# Patient Record
Sex: FEMALE | Race: WHITE | NOT HISPANIC OR LATINO | URBAN - METROPOLITAN AREA
[De-identification: names, ages, dates, MRNs, and addresses within clinical notes are randomized per-mention and may not be internally consistent; named-entity substitution may affect disease eponyms.]

---

## 2020-02-25 ENCOUNTER — APPOINTMENT (RX ONLY)
Dept: URBAN - METROPOLITAN AREA CLINIC 374 | Facility: CLINIC | Age: 20
Setting detail: DERMATOLOGY
End: 2020-02-25

## 2020-02-25 DIAGNOSIS — L70.0 ACNE VULGARIS: ICD-10-CM

## 2020-02-25 PROCEDURE — 99202 OFFICE O/P NEW SF 15 MIN: CPT

## 2020-02-25 PROCEDURE — ? PRESCRIPTION MEDICATION MANAGEMENT

## 2020-02-25 PROCEDURE — ? MEDICATION COUNSELING

## 2020-02-25 PROCEDURE — ? PRESCRIPTION

## 2020-02-25 PROCEDURE — ? COUNSELING

## 2020-02-25 RX ORDER — ADAPALENE AND BENZOYL PEROXIDE 3; 25 MG/G; MG/G
GEL TOPICAL QHS
Qty: 1 | Refills: 3 | Status: ERX | COMMUNITY
Start: 2020-02-25

## 2020-02-25 RX ORDER — DAPSONE 75 MG/G
GEL TOPICAL
Qty: 1 | Refills: 3 | Status: ERX | COMMUNITY
Start: 2020-02-25

## 2020-02-25 RX ADMIN — ADAPALENE AND BENZOYL PEROXIDE: 3; 25 GEL TOPICAL at 00:00

## 2020-02-25 RX ADMIN — DAPSONE: 75 GEL TOPICAL at 00:00

## 2020-02-25 ASSESSMENT — LOCATION SIMPLE DESCRIPTION DERM
LOCATION SIMPLE: LEFT CHEEK
LOCATION SIMPLE: RIGHT CHEEK
LOCATION SIMPLE: INFERIOR FOREHEAD

## 2020-02-25 ASSESSMENT — LOCATION DETAILED DESCRIPTION DERM
LOCATION DETAILED: RIGHT CENTRAL MALAR CHEEK
LOCATION DETAILED: LEFT CENTRAL MALAR CHEEK
LOCATION DETAILED: INFERIOR MID FOREHEAD

## 2020-02-25 ASSESSMENT — LOCATION ZONE DERM: LOCATION ZONE: FACE

## 2020-02-25 NOTE — PROCEDURE: MEDICATION COUNSELING
Xelvladimirz Pregnancy And Lactation Text: This medication is Pregnancy Category D and is not considered safe during pregnancy.  The risk during breast feeding is also uncertain.

## 2020-02-25 NOTE — PROCEDURE: COUNSELING
Detail Level: Detailed
Azithromycin Pregnancy And Lactation Text: This medication is considered safe during pregnancy and is also secreted in breast milk.
Topical Retinoid counseling:  Patient advised to apply a pea-sized amount only at bedtime and wait 30 minutes after washing their face before applying.  If too drying, patient may add a non-comedogenic moisturizer. The patient verbalized understanding of the proper use and possible adverse effects of retinoids.  All of the patient's questions and concerns were addressed.
Use Enhanced Medication Counseling?: No
High Dose Vitamin A Pregnancy And Lactation Text: High dose vitamin A therapy is contraindicated during pregnancy and breast feeding.
Topical Clindamycin Pregnancy And Lactation Text: This medication is Pregnancy Category B and is considered safe during pregnancy. It is unknown if it is excreted in breast milk.
Tetracycline Counseling: Patient counseled regarding possible photosensitivity and increased risk for sunburn.  Patient instructed to avoid sunlight, if possible.  When exposed to sunlight, patients should wear protective clothing, sunglasses, and sunscreen.  The patient was instructed to call the office immediately if the following severe adverse effects occur:  hearing changes, easy bruising/bleeding, severe headache, or vision changes.  The patient verbalized understanding of the proper use and possible adverse effects of tetracycline.  All of the patient's questions and concerns were addressed. Patient understands to avoid pregnancy while on therapy due to potential birth defects.
Dapsone Counseling: I discussed with the patient the risks of dapsone including but not limited to hemolytic anemia, agranulocytosis, rashes, methemoglobinemia, kidney failure, peripheral neuropathy, headaches, GI upset, and liver toxicity.  Patients who start dapsone require monitoring including baseline LFTs and weekly CBCs for the first month, then every month thereafter.  The patient verbalized understanding of the proper use and possible adverse effects of dapsone.  All of the patient's questions and concerns were addressed.
Erythromycin Pregnancy And Lactation Text: This medication is Pregnancy Category B and is considered safe during pregnancy. It is also excreted in breast milk.
Bactrim Counseling:  I discussed with the patient the risks of sulfa antibiotics including but not limited to GI upset, allergic reaction, drug rash, diarrhea, dizziness, photosensitivity, and yeast infections.  Rarely, more serious reactions can occur including but not limited to aplastic anemia, agranulocytosis, methemoglobinemia, blood dyscrasias, liver or kidney failure, lung infiltrates or desquamative/blistering drug rashes.
Topical Retinoid Pregnancy And Lactation Text: This medication is Pregnancy Category C. It is unknown if this medication is excreted in breast milk.
Topical Sulfur Applications Counseling: Topical Sulfur Counseling: Patient counseled that this medication may cause skin irritation or allergic reactions.  In the event of skin irritation, the patient was advised to reduce the amount of the drug applied or use it less frequently.   The patient verbalized understanding of the proper use and possible adverse effects of topical sulfur application.  All of the patient's questions and concerns were addressed.
Minocycline Counseling: Patient advised regarding possible photosensitivity and discoloration of the teeth, skin, lips, tongue and gums.  Patient instructed to avoid sunlight, if possible.  When exposed to sunlight, patients should wear protective clothing, sunglasses, and sunscreen.  The patient was instructed to call the office immediately if the following severe adverse effects occur:  hearing changes, easy bruising/bleeding, severe headache, or vision changes.  The patient verbalized understanding of the proper use and possible adverse effects of minocycline.  All of the patient's questions and concerns were addressed.
Dapsone Pregnancy And Lactation Text: This medication is Pregnancy Category C and is not considered safe during pregnancy or breast feeding.
Tetracycline Pregnancy And Lactation Text: This medication is Pregnancy Category D and not consider safe during pregnancy. It is also excreted in breast milk.
Isotretinoin Counseling: Patient should get monthly blood tests, not donate blood, not drive at night if vision affected, not share medication, and not undergo elective surgery for 6 months after tx completed. Side effects reviewed, pt to contact office should one occur.
Bactrim Pregnancy And Lactation Text: This medication is Pregnancy Category D and is known to cause fetal risk.  It is also excreted in breast milk.
Tazorac Counseling:  Patient advised that medication is irritating and drying.  Patient may need to apply sparingly and wash off after an hour before eventually leaving it on overnight.  The patient verbalized understanding of the proper use and possible adverse effects of tazorac.  All of the patient's questions and concerns were addressed.
Topical Sulfur Applications Pregnancy And Lactation Text: This medication is Pregnancy Category C and has an unknown safety profile during pregnancy. It is unknown if this topical medication is excreted in breast milk.
Benzoyl Peroxide Counseling: Patient counseled that medicine may cause skin irritation and bleach clothing.  In the event of skin irritation, the patient was advised to reduce the amount of the drug applied or use it less frequently.   The patient verbalized understanding of the proper use and possible adverse effects of benzoyl peroxide.  All of the patient's questions and concerns were addressed.
Doxycycline Counseling:  Patient counseled regarding possible photosensitivity and increased risk for sunburn.  Patient instructed to avoid sunlight, if possible.  When exposed to sunlight, patients should wear protective clothing, sunglasses, and sunscreen.  The patient was instructed to call the office immediately if the following severe adverse effects occur:  hearing changes, easy bruising/bleeding, severe headache, or vision changes.  The patient verbalized understanding of the proper use and possible adverse effects of doxycycline.  All of the patient's questions and concerns were addressed.
Isotretinoin Pregnancy And Lactation Text: This medication is Pregnancy Category X and is considered extremely dangerous during pregnancy. It is unknown if it is excreted in breast milk.
Birth Control Pills Counseling: Birth Control Pill Counseling: I discussed with the patient the potential side effects of OCPs including but not limited to increased risk of stroke, heart attack, thrombophlebitis, deep venous thrombosis, hepatic adenomas, breast changes, GI upset, headaches, and depression.  The patient verbalized understanding of the proper use and possible adverse effects of OCPs. All of the patient's questions and concerns were addressed.
Tazorac Pregnancy And Lactation Text: This medication is not safe during pregnancy. It is unknown if this medication is excreted in breast milk.
Spironolactone Counseling: Patient advised regarding risks of diarrhea, abdominal pain, hyperkalemia, birth defects (for female patients), liver toxicity and renal toxicity. The patient may need blood work to monitor liver and kidney function and potassium levels while on therapy. The patient verbalized understanding of the proper use and possible adverse effects of spironolactone.  All of the patient's questions and concerns were addressed.
Doxycycline Pregnancy And Lactation Text: This medication is Pregnancy Category D and not consider safe during pregnancy. It is also excreted in breast milk but is considered safe for shorter treatment courses.
Azithromycin Counseling:  I discussed with the patient the risks of azithromycin including but not limited to GI upset, allergic reaction, drug rash, diarrhea, and yeast infections.
Benzoyl Peroxide Pregnancy And Lactation Text: This medication is Pregnancy Category C. It is unknown if benzoyl peroxide is excreted in breast milk.
Birth Control Pills Pregnancy And Lactation Text: This medication should be avoided if pregnant and for the first 30 days post-partum.
Topical Clindamycin Counseling: Patient counseled that this medication may cause skin irritation or allergic reactions.  In the event of skin irritation, the patient was advised to reduce the amount of the drug applied or use it less frequently.   The patient verbalized understanding of the proper use and possible adverse effects of clindamycin.  All of the patient's questions and concerns were addressed.
High Dose Vitamin A Counseling: Side effects reviewed, pt to contact office should one occur.
Spironolactone Pregnancy And Lactation Text: This medication can cause feminization of the male fetus and should be avoided during pregnancy. The active metabolite is also found in breast milk.
Erythromycin Counseling:  I discussed with the patient the risks of erythromycin including but not limited to GI upset, allergic reaction, drug rash, diarrhea, increase in liver enzymes, and yeast infections.

## 2020-09-08 ENCOUNTER — RX ONLY (OUTPATIENT)
Age: 20
Setting detail: RX ONLY
End: 2020-09-08

## 2020-09-08 RX ORDER — MINOCYCLINE HYDROCHLORIDE 90 MG/1
CAPSULE, EXTENDED RELEASE ORAL
Qty: 30 | Refills: 1 | Status: ERX

## 2021-01-12 ENCOUNTER — APPOINTMENT (RX ONLY)
Dept: URBAN - METROPOLITAN AREA CLINIC 374 | Facility: CLINIC | Age: 21
Setting detail: DERMATOLOGY
End: 2021-01-12

## 2021-01-12 DIAGNOSIS — L70.0 ACNE VULGARIS: ICD-10-CM | Status: INADEQUATELY CONTROLLED

## 2021-01-12 PROCEDURE — ? COUNSELING

## 2021-01-12 PROCEDURE — ? PRESCRIPTION

## 2021-01-12 PROCEDURE — ? PRESCRIPTION MEDICATION MANAGEMENT

## 2021-01-12 PROCEDURE — 99214 OFFICE O/P EST MOD 30 MIN: CPT

## 2021-01-12 RX ORDER — CLINDAMYCIN PHOSPHATE AND BENZOYL PEROXIDE 10; 37.5 MG/G; MG/G
1 GEL TOPICAL BID
Qty: 1 | Refills: 3 | Status: CANCELLED | COMMUNITY
Start: 2021-01-12

## 2021-01-12 RX ORDER — MINOCYCLINE HYDROCHLORIDE 90 MG/1
CAPSULE, EXTENDED RELEASE ORAL
Qty: 30 | Refills: 3 | Status: ERX

## 2021-01-12 RX ADMIN — CLINDAMYCIN PHOSPHATE AND BENZOYL PEROXIDE 1: 10; 37.5 GEL TOPICAL at 00:00

## 2021-01-12 ASSESSMENT — LOCATION SIMPLE DESCRIPTION DERM
LOCATION SIMPLE: INFERIOR FOREHEAD
LOCATION SIMPLE: RIGHT CHEEK
LOCATION SIMPLE: LEFT CHEEK

## 2021-01-12 ASSESSMENT — LOCATION DETAILED DESCRIPTION DERM
LOCATION DETAILED: INFERIOR MID FOREHEAD
LOCATION DETAILED: RIGHT CENTRAL MALAR CHEEK
LOCATION DETAILED: LEFT CENTRAL MALAR CHEEK

## 2021-01-12 ASSESSMENT — LOCATION ZONE DERM: LOCATION ZONE: FACE

## 2021-01-12 NOTE — PROCEDURE: PRESCRIPTION MEDICATION MANAGEMENT
Render In Strict Bullet Format?: No
Detail Level: Zone
Initiate Treatment: Ximino 90 mg capsule, extended release: Take 1 tab po QDAY with FOOD\\nOnexton 1.2 % (1 % base)-3.75 % topical gel Bid: Apply to face bid

## 2021-01-14 ENCOUNTER — RX ONLY (OUTPATIENT)
Age: 21
Setting detail: RX ONLY
End: 2021-01-14

## 2021-01-14 RX ORDER — CLINDAMYCIN PHOSPHATE AND BENZOYL PEROXIDE 10; 50 MG/G; MG/G
GEL TOPICAL
Qty: 1 | Refills: 3 | Status: ERX | COMMUNITY
Start: 2021-01-14

## 2021-03-02 ENCOUNTER — APPOINTMENT (RX ONLY)
Dept: URBAN - METROPOLITAN AREA CLINIC 374 | Facility: CLINIC | Age: 21
Setting detail: DERMATOLOGY
End: 2021-03-02

## 2021-03-02 DIAGNOSIS — L70.0 ACNE VULGARIS: ICD-10-CM | Status: WORSENING

## 2021-03-02 PROCEDURE — ? PRESCRIPTION

## 2021-03-02 PROCEDURE — 99214 OFFICE O/P EST MOD 30 MIN: CPT

## 2021-03-02 PROCEDURE — ? PRESCRIPTION MEDICATION MANAGEMENT

## 2021-03-02 RX ORDER — TAZAROTENE 0.45 MG/G
1 LOTION TOPICAL QHS
Qty: 1 | Refills: 3 | Status: ERX | COMMUNITY
Start: 2021-03-02

## 2021-03-02 RX ORDER — MINOCYCLINE 40 MG/G
1 AEROSOL, FOAM TOPICAL QAM
Qty: 1 | Refills: 2 | Status: ERX | COMMUNITY
Start: 2021-03-02

## 2021-03-02 RX ADMIN — MINOCYCLINE 1: 40 AEROSOL, FOAM TOPICAL at 00:00

## 2021-03-02 RX ADMIN — TAZAROTENE 1: 0.45 LOTION TOPICAL at 00:00

## 2021-03-02 ASSESSMENT — LOCATION SIMPLE DESCRIPTION DERM
LOCATION SIMPLE: LEFT CHEEK
LOCATION SIMPLE: INFERIOR FOREHEAD
LOCATION SIMPLE: RIGHT CHEEK

## 2021-03-02 ASSESSMENT — LOCATION ZONE DERM: LOCATION ZONE: FACE

## 2021-03-02 NOTE — PROCEDURE: PRESCRIPTION MEDICATION MANAGEMENT
Discontinue Regimen: Onexton 1.2 % (1 % base)-3.75 % topical gel Bid: Apply to face bid
Render In Strict Bullet Format?: No
Continue Regimen: Ximino 90 mg capsule, extended release: Take 1 tab po QDAY with FOOD
Detail Level: Zone
Initiate Treatment: Arazlo 0.045 % lotion Qhs: apply a pea size amount QHS to acne of face\\nAmzeeq 4 % topical foam QAM: Apply a pea size amount to face QAM

## 2021-04-20 ENCOUNTER — APPOINTMENT (RX ONLY)
Dept: URBAN - METROPOLITAN AREA CLINIC 374 | Facility: CLINIC | Age: 21
Setting detail: DERMATOLOGY
End: 2021-04-20

## 2021-04-20 DIAGNOSIS — L70.0 ACNE VULGARIS: ICD-10-CM | Status: INADEQUATELY CONTROLLED

## 2021-04-20 PROCEDURE — ? PRESCRIPTION MEDICATION MANAGEMENT

## 2021-04-20 PROCEDURE — 99214 OFFICE O/P EST MOD 30 MIN: CPT

## 2021-04-20 PROCEDURE — ? PRESCRIPTION

## 2021-04-20 RX ORDER — ADAPALENE AND BENZOYL PEROXIDE 3; 25 MG/G; MG/G
GEL TOPICAL QHS
Qty: 1 | Refills: 2 | Status: ERX

## 2021-04-20 ASSESSMENT — LOCATION SIMPLE DESCRIPTION DERM
LOCATION SIMPLE: LEFT CHEEK
LOCATION SIMPLE: INFERIOR FOREHEAD
LOCATION SIMPLE: RIGHT CHEEK

## 2021-04-20 ASSESSMENT — LOCATION ZONE DERM: LOCATION ZONE: FACE

## 2021-04-20 NOTE — PROCEDURE: PRESCRIPTION MEDICATION MANAGEMENT
Discontinue Regimen: Arazlo lotion, amzeeq foam
Render In Strict Bullet Format?: No
Continue Regimen: Ximino 90 mg capsule, extended release: Take 1 tab po QDAY with FOOD\\n(Patient was prescribed two medication from gynecologist and will continue to see if that helps her acne, she does not remember the name of the medications (testosterone blockers)but will bring it next time)
Detail Level: Zone
Initiate Treatment: Epiduo Forte 0.3 %-2.5 % topical gel with pump: Apply qhs to the acne of the face

## 2021-04-26 ENCOUNTER — RX ONLY (OUTPATIENT)
Age: 21
Setting detail: RX ONLY
End: 2021-04-26

## 2021-04-26 RX ORDER — MINOCYCLINE HYDROCHLORIDE 90 MG/1
CAPSULE, EXTENDED RELEASE ORAL
Qty: 30 | Refills: 3 | Status: ERX

## 2022-07-13 ENCOUNTER — APPOINTMENT (RX ONLY)
Dept: URBAN - METROPOLITAN AREA CLINIC 374 | Facility: CLINIC | Age: 22
Setting detail: DERMATOLOGY
End: 2022-07-13

## 2022-07-13 DIAGNOSIS — L70.0 ACNE VULGARIS: ICD-10-CM | Status: WORSENING

## 2022-07-13 DIAGNOSIS — Z79.899 OTHER LONG TERM (CURRENT) DRUG THERAPY: ICD-10-CM

## 2022-07-13 PROCEDURE — ? MEDICATION COUNSELING

## 2022-07-13 PROCEDURE — ? ISOTRETINOIN INITIATION

## 2022-07-13 PROCEDURE — ? PHOTO-DOCUMENTATION

## 2022-07-13 PROCEDURE — ? PRESCRIPTION MEDICATION MANAGEMENT

## 2022-07-13 PROCEDURE — ? COUNSELING

## 2022-07-13 PROCEDURE — 81025 URINE PREGNANCY TEST: CPT

## 2022-07-13 PROCEDURE — ? ORDER TESTS

## 2022-07-13 PROCEDURE — ? URINE PREGNANCY TEST

## 2022-07-13 PROCEDURE — 99214 OFFICE O/P EST MOD 30 MIN: CPT

## 2022-07-13 ASSESSMENT — LOCATION SIMPLE DESCRIPTION DERM
LOCATION SIMPLE: INFERIOR FOREHEAD
LOCATION SIMPLE: RIGHT CHEEK
LOCATION SIMPLE: LEFT CHEEK

## 2022-07-13 ASSESSMENT — LOCATION ZONE DERM: LOCATION ZONE: FACE

## 2022-07-13 NOTE — PROCEDURE: PRESCRIPTION MEDICATION MANAGEMENT
Discontinue Regimen: Ximino 90 mg capsule, extended release: Take 1 tab po QDAY with FOOD\\nEpiduo Forte 0.3 %-2.5 % topical gel with pump: Apply qhs to the acne of the face
Render In Strict Bullet Format?: No
Plan: Will initiate accutane pending insurance approval
Detail Level: Zone

## 2022-07-13 NOTE — PROCEDURE: ORDER TESTS
Bill For Surgical Tray: no
Performing Laboratory: [id number]
Billing Type: Third-Party Bill
Expected Date Of Service: 07/13/2022

## 2022-08-17 ENCOUNTER — APPOINTMENT (RX ONLY)
Dept: URBAN - METROPOLITAN AREA CLINIC 374 | Facility: CLINIC | Age: 22
Setting detail: DERMATOLOGY
End: 2022-08-17

## 2022-08-17 DIAGNOSIS — L70.0 ACNE VULGARIS: ICD-10-CM | Status: UNCHANGED

## 2022-08-17 DIAGNOSIS — Z79.899 OTHER LONG TERM (CURRENT) DRUG THERAPY: ICD-10-CM | Status: STABLE

## 2022-08-17 PROCEDURE — ? PRESCRIPTION MEDICATION MANAGEMENT

## 2022-08-17 PROCEDURE — ? ISOTRETINOIN INITIATION

## 2022-08-17 PROCEDURE — ? PHOTO-DOCUMENTATION

## 2022-08-17 PROCEDURE — ? URINE PREGNANCY TEST

## 2022-08-17 PROCEDURE — ? ORDER TESTS

## 2022-08-17 PROCEDURE — ? MEDICATION COUNSELING

## 2022-08-17 PROCEDURE — 81025 URINE PREGNANCY TEST: CPT

## 2022-08-17 PROCEDURE — ? PRESCRIPTION

## 2022-08-17 PROCEDURE — 99214 OFFICE O/P EST MOD 30 MIN: CPT

## 2022-08-17 PROCEDURE — ? COUNSELING

## 2022-08-17 RX ORDER — ISOTRETINOIN 24 MG/1
1 CAPSULE ORAL BID
Qty: 60 | Refills: 0 | Status: ERX | COMMUNITY
Start: 2022-08-17

## 2022-08-17 RX ADMIN — ISOTRETINOIN 1: 24 CAPSULE ORAL at 00:00

## 2022-08-17 ASSESSMENT — LOCATION ZONE DERM: LOCATION ZONE: FACE

## 2022-08-17 ASSESSMENT — LOCATION SIMPLE DESCRIPTION DERM
LOCATION SIMPLE: INFERIOR FOREHEAD
LOCATION SIMPLE: RIGHT CHEEK
LOCATION SIMPLE: LEFT CHEEK

## 2022-08-17 NOTE — PROCEDURE: PRESCRIPTION MEDICATION MANAGEMENT
Render In Strict Bullet Format?: No
Detail Level: Zone
Initiate Treatment: Absorica LD 24 mg capsule: Take one pill po BID

## 2022-08-17 NOTE — PROCEDURE: ORDER TESTS
Bill For Surgical Tray: no
Performing Laboratory: [id number]
Billing Type: Third-Party Bill
Expected Date Of Service: 07/13/2022

## 2022-08-17 NOTE — PROCEDURE: HIGH RISK MEDICATION MONITORING
1.69 Zyclara Pregnancy And Lactation Text: This medication is Pregnancy Category C. It is unknown if this medication is excreted in breast milk.

## 2022-08-17 NOTE — PROCEDURE: HIGH RISK MEDICATION MONITORING
faxed   Imiquimod Counseling:  I discussed with the patient the risks of imiquimod including but not limited to erythema, scaling, itching, weeping, crusting, and pain.  Patient understands that the inflammatory response to imiquimod is variable from person to person and was educated regarded proper titration schedule.  If flu-like symptoms develop, patient knows to discontinue the medication and contact us.

## 2022-08-17 NOTE — PROCEDURE: MEDICATION COUNSELING
Epidermal Autograft Text: The defect edges were debeveled with a #15 scalpel blade.  Given the location of the defect, shape of the defect and the proximity to free margins an epidermal autograft was deemed most appropriate.  Using a sterile surgical marker, the primary defect shape was transferred to the donor site. The epidermal graft was then harvested.  The skin graft was then placed in the primary defect and oriented appropriately. Clofazimine Pregnancy And Lactation Text: This medication is Pregnancy Category C and isn't considered safe during pregnancy. It is excreted in breast milk.

## 2022-08-17 NOTE — PROCEDURE: HIGH RISK MEDICATION MONITORING
Intrauterine Growth Restriction Doxepin Pregnancy And Lactation Text: This medication is Pregnancy Category C and it isn't known if it is safe during pregnancy. It is also excreted in breast milk and breast feeding isn't recommended.

## 2022-09-14 ENCOUNTER — APPOINTMENT (RX ONLY)
Dept: URBAN - METROPOLITAN AREA CLINIC 374 | Facility: CLINIC | Age: 22
Setting detail: DERMATOLOGY
End: 2022-09-14

## 2022-09-14 DIAGNOSIS — Z79.899 OTHER LONG TERM (CURRENT) DRUG THERAPY: ICD-10-CM

## 2022-09-14 DIAGNOSIS — L70.0 ACNE VULGARIS: ICD-10-CM | Status: UNCHANGED

## 2022-09-14 PROCEDURE — ? PRESCRIPTION MEDICATION MANAGEMENT

## 2022-09-14 PROCEDURE — ? ORDER TESTS

## 2022-09-14 PROCEDURE — 99214 OFFICE O/P EST MOD 30 MIN: CPT

## 2022-09-14 PROCEDURE — ? MEDICATION COUNSELING

## 2022-09-14 PROCEDURE — ? PHOTO-DOCUMENTATION

## 2022-09-14 PROCEDURE — ? ISOTRETINOIN MONITORING

## 2022-09-14 PROCEDURE — ? COUNSELING

## 2022-09-14 ASSESSMENT — LOCATION SIMPLE DESCRIPTION DERM
LOCATION SIMPLE: RIGHT CHEEK
LOCATION SIMPLE: LEFT CHEEK
LOCATION SIMPLE: INFERIOR FOREHEAD

## 2022-09-14 ASSESSMENT — LOCATION ZONE DERM: LOCATION ZONE: FACE

## 2022-09-14 ASSESSMENT — LOCATION DETAILED DESCRIPTION DERM
LOCATION DETAILED: RIGHT CENTRAL MALAR CHEEK
LOCATION DETAILED: INFERIOR MID FOREHEAD
LOCATION DETAILED: LEFT CENTRAL MALAR CHEEK

## 2022-09-14 NOTE — PROCEDURE: ISOTRETINOIN MONITORING
normal... Xerosis Normal Treatment: I recommended application of Cetaphil or CeraVe numerous times a day going to bed to all dry areas.

## 2022-09-14 NOTE — PROCEDURE: ORDER TESTS
Blood work pending will notify patient with results when they become available.  
Bill For Surgical Tray: no
Performing Laboratory: [id number]
Billing Type: Third-Party Bill
Expected Date Of Service: 07/13/2022

## 2022-09-14 NOTE — PROCEDURE: HIGH RISK MEDICATION MONITORING
S/p wound revision and closure with LD placement    Stable decreased fluid output   Metronidazole Counseling:  I discussed with the patient the risks of metronidazole including but not limited to seizures, nausea/vomiting, a metallic taste in the mouth, nausea/vomiting and severe allergy.

## 2022-09-14 NOTE — PROCEDURE: PRESCRIPTION MEDICATION MANAGEMENT
Modify Regimen: Absorica LD 24 mg capsule —> Accutane 30mg: Take one pill po BID
Render In Strict Bullet Format?: No
Detail Level: Zone

## 2022-09-16 ENCOUNTER — RX ONLY (OUTPATIENT)
Age: 22
Setting detail: RX ONLY
End: 2022-09-16

## 2022-09-16 RX ORDER — ISOTRETINOIN 24 MG/1
1 CAPSULE ORAL BID
Qty: 60 | Refills: 0 | Status: ERX

## 2022-09-20 ENCOUNTER — RX ONLY (OUTPATIENT)
Age: 22
Setting detail: RX ONLY
End: 2022-09-20

## 2022-09-20 RX ORDER — ISOTRETINOIN 30 MG/1
1 CAPSULE, GELATIN COATED ORAL BID
Qty: 60 | Refills: 0 | Status: ERX

## 2022-10-12 ENCOUNTER — APPOINTMENT (RX ONLY)
Dept: URBAN - METROPOLITAN AREA CLINIC 374 | Facility: CLINIC | Age: 22
Setting detail: DERMATOLOGY
End: 2022-10-12

## 2022-10-12 DIAGNOSIS — L70.0 ACNE VULGARIS: ICD-10-CM | Status: IMPROVED

## 2022-10-12 DIAGNOSIS — Z79.899 OTHER LONG TERM (CURRENT) DRUG THERAPY: ICD-10-CM

## 2022-10-12 PROCEDURE — ? ORDER TESTS

## 2022-10-12 PROCEDURE — ? LAB REPORTS REVIEWED

## 2022-10-12 PROCEDURE — 99214 OFFICE O/P EST MOD 30 MIN: CPT

## 2022-10-12 PROCEDURE — ? ISOTRETINOIN MONITORING

## 2022-10-12 PROCEDURE — ? MEDICATION COUNSELING

## 2022-10-12 PROCEDURE — ? PHOTO-DOCUMENTATION

## 2022-10-12 PROCEDURE — ? PRESCRIPTION MEDICATION MANAGEMENT

## 2022-10-12 PROCEDURE — ? ADDITIONAL NOTES

## 2022-10-12 PROCEDURE — ? PRESCRIPTION

## 2022-10-12 PROCEDURE — ? COUNSELING

## 2022-10-12 RX ORDER — ISOTRETINOIN 40 MG/1
CAPSULE, GELATIN COATED ORAL
Qty: 60 | Refills: 0 | Status: ERX | COMMUNITY
Start: 2022-10-12

## 2022-10-12 RX ADMIN — ISOTRETINOIN 1: 40 CAPSULE, GELATIN COATED ORAL at 00:00

## 2022-10-12 ASSESSMENT — LOCATION ZONE DERM: LOCATION ZONE: FACE

## 2022-10-12 ASSESSMENT — LOCATION SIMPLE DESCRIPTION DERM
LOCATION SIMPLE: INFERIOR FOREHEAD
LOCATION SIMPLE: RIGHT CHEEK
LOCATION SIMPLE: LEFT CHEEK

## 2022-10-12 NOTE — PROCEDURE: ORDER TESTS
Bill For Surgical Tray: no
Performing Laboratory: [id number]
Billing Type: Third-Party Bill
Expected Date Of Service: 07/13/2022

## 2022-10-12 NOTE — PROCEDURE: PRESCRIPTION MEDICATION MANAGEMENT
Discontinue Regimen: Accutane 30mg: Take one pill po BID
Render In Strict Bullet Format?: No
Detail Level: Zone
Initiate Treatment: Accutane 40 mg capsule: Take one tab po bid with food

## 2022-11-09 ENCOUNTER — APPOINTMENT (RX ONLY)
Dept: URBAN - METROPOLITAN AREA CLINIC 374 | Facility: CLINIC | Age: 22
Setting detail: DERMATOLOGY
End: 2022-11-09

## 2022-11-09 DIAGNOSIS — L70.0 ACNE VULGARIS: ICD-10-CM | Status: IMPROVED

## 2022-11-09 DIAGNOSIS — Z79.899 OTHER LONG TERM (CURRENT) DRUG THERAPY: ICD-10-CM

## 2022-11-09 PROCEDURE — ? ADDITIONAL NOTES

## 2022-11-09 PROCEDURE — ? ISOTRETINOIN MONITORING

## 2022-11-09 PROCEDURE — 81025 URINE PREGNANCY TEST: CPT

## 2022-11-09 PROCEDURE — ? COUNSELING

## 2022-11-09 PROCEDURE — 99214 OFFICE O/P EST MOD 30 MIN: CPT

## 2022-11-09 PROCEDURE — ? URINE PREGNANCY TEST

## 2022-11-09 PROCEDURE — ? PRESCRIPTION

## 2022-11-09 PROCEDURE — ? MEDICATION COUNSELING

## 2022-11-09 PROCEDURE — ? PRESCRIPTION MEDICATION MANAGEMENT

## 2022-11-09 PROCEDURE — ? PHOTO-DOCUMENTATION

## 2022-11-09 RX ORDER — ISOTRETINOIN 40 MG/1
CAPSULE, GELATIN COATED ORAL
Qty: 60 | Refills: 0 | Status: ERX

## 2022-11-09 ASSESSMENT — LOCATION SIMPLE DESCRIPTION DERM
LOCATION SIMPLE: LEFT CHEEK
LOCATION SIMPLE: INFERIOR FOREHEAD
LOCATION SIMPLE: RIGHT CHEEK

## 2022-11-09 ASSESSMENT — LOCATION ZONE DERM: LOCATION ZONE: FACE

## 2022-11-09 NOTE — PROCEDURE: MEDICATION COUNSELING
No difficulties Terbinafine Counseling: Patient counseling regarding adverse effects of terbinafine including but not limited to headache, diarrhea, rash, upset stomach, liver function test abnormalities, itching, taste/smell disturbance, nausea, abdominal pain, and flatulence.  There is a rare possibility of liver failure that can occur when taking terbinafine.  The patient understands that a baseline LFT and kidney function test may be required. The patient verbalized understanding of the proper use and possible adverse effects of terbinafine.  All of the patient's questions and concerns were addressed.

## 2022-11-09 NOTE — PROCEDURE: PRESCRIPTION MEDICATION MANAGEMENT
Render In Strict Bullet Format?: No
Continue Regimen: Accutane 40 mg capsule: Take one tab po bid with food
Detail Level: Zone

## 2022-12-07 ENCOUNTER — APPOINTMENT (RX ONLY)
Dept: URBAN - METROPOLITAN AREA CLINIC 374 | Facility: CLINIC | Age: 22
Setting detail: DERMATOLOGY
End: 2022-12-07

## 2022-12-07 DIAGNOSIS — Z79.899 OTHER LONG TERM (CURRENT) DRUG THERAPY: ICD-10-CM | Status: WELL CONTROLLED

## 2022-12-07 DIAGNOSIS — L70.0 ACNE VULGARIS: ICD-10-CM | Status: IMPROVED

## 2022-12-07 PROCEDURE — ? PRESCRIPTION

## 2022-12-07 PROCEDURE — ? COUNSELING

## 2022-12-07 PROCEDURE — 81025 URINE PREGNANCY TEST: CPT

## 2022-12-07 PROCEDURE — ? ISOTRETINOIN MONITORING

## 2022-12-07 PROCEDURE — ? PRESCRIPTION MEDICATION MANAGEMENT

## 2022-12-07 PROCEDURE — ? PHOTO-DOCUMENTATION

## 2022-12-07 PROCEDURE — ? MEDICATION COUNSELING

## 2022-12-07 PROCEDURE — 99214 OFFICE O/P EST MOD 30 MIN: CPT

## 2022-12-07 PROCEDURE — ? URINE PREGNANCY TEST

## 2022-12-07 PROCEDURE — ? ADDITIONAL NOTES

## 2022-12-07 RX ORDER — ISOTRETINOIN 40 MG/1
CAPSULE, GELATIN COATED ORAL
Qty: 60 | Refills: 0 | Status: ERX

## 2022-12-07 ASSESSMENT — LOCATION ZONE DERM: LOCATION ZONE: FACE

## 2022-12-07 ASSESSMENT — LOCATION SIMPLE DESCRIPTION DERM
LOCATION SIMPLE: INFERIOR FOREHEAD
LOCATION SIMPLE: RIGHT CHEEK
LOCATION SIMPLE: LEFT CHEEK

## 2023-01-04 ENCOUNTER — APPOINTMENT (RX ONLY)
Dept: URBAN - METROPOLITAN AREA CLINIC 374 | Facility: CLINIC | Age: 23
Setting detail: DERMATOLOGY
End: 2023-01-04

## 2023-01-04 DIAGNOSIS — L70.0 ACNE VULGARIS: ICD-10-CM

## 2023-01-04 DIAGNOSIS — Z79.899 OTHER LONG TERM (CURRENT) DRUG THERAPY: ICD-10-CM

## 2023-01-04 PROCEDURE — ? URINE PREGNANCY TEST

## 2023-01-04 PROCEDURE — 99214 OFFICE O/P EST MOD 30 MIN: CPT

## 2023-01-04 PROCEDURE — ? MEDICATION COUNSELING

## 2023-01-04 PROCEDURE — 81025 URINE PREGNANCY TEST: CPT

## 2023-01-04 PROCEDURE — ? COUNSELING

## 2023-01-04 PROCEDURE — ? PRESCRIPTION MEDICATION MANAGEMENT

## 2023-01-04 PROCEDURE — ? ISOTRETINOIN MONITORING

## 2023-01-04 ASSESSMENT — LOCATION SIMPLE DESCRIPTION DERM
LOCATION SIMPLE: RIGHT CHEEK
LOCATION SIMPLE: LEFT CHEEK
LOCATION SIMPLE: INFERIOR FOREHEAD

## 2023-01-04 ASSESSMENT — LOCATION ZONE DERM: LOCATION ZONE: FACE

## 2023-01-04 NOTE — PROCEDURE: MEDICATION COUNSELING
mother Isotretinoin Pregnancy And Lactation Text: This medication is Pregnancy Category X and is considered extremely dangerous during pregnancy. It is unknown if it is excreted in breast milk.

## 2023-01-04 NOTE — PROCEDURE: MEDICATION COUNSELING
[facility] on [phone] Benzoyl Peroxide Counseling: Patient counseled that medicine may cause skin irritation and bleach clothing.  In the event of skin irritation, the patient was advised to reduce the amount of the drug applied or use it less frequently.   The patient verbalized understanding of the proper use and possible adverse effects of benzoyl peroxide.  All of the patient's questions and concerns were addressed.

## 2023-01-04 NOTE — PROCEDURE: MEDICATION COUNSELING
Rest.   Fluids  Elevate hand, leg  Cool compresses  Tylenol and/or Ibuprofen for pain  Augmentin as directed; take with food.     Keep involved area dry and clean for the next 24 hours. After 24 hours you may remove dressing and carefully wash the involved area with soap and water, rinse well, pat dry. No soaking. Can apply a thin layer of an antibiotic ointment several times a day for the next 2-3 days. Sutures need to be removed in 7-10 days. Follow up sooner if fever, chills, increasing redness, warmth or foul drainage is noted.     Follow up with [facility]     Zyclara Pregnancy And Lactation Text: This medication is Pregnancy Category C. It is unknown if this medication is excreted in breast milk.

## 2023-01-24 NOTE — PROCEDURE: MEDICATION COUNSELING
Well positioned. Erivedge Pregnancy And Lactation Text: This medication is Pregnancy Category X and is absolutely contraindicated during pregnancy. It is unknown if it is excreted in breast milk.

## 2023-02-16 NOTE — PROCEDURE: MEDICATION COUNSELING
show Repair Type: Complex Repair Cimetidine Pregnancy And Lactation Text: This medication is Pregnancy Category B and is considered safe during pregnancy. It is also excreted in breast milk and breast feeding isn't recommended.

## 2023-03-16 ENCOUNTER — APPOINTMENT (RX ONLY)
Dept: URBAN - METROPOLITAN AREA CLINIC 374 | Facility: CLINIC | Age: 23
Setting detail: DERMATOLOGY
End: 2023-03-16

## 2023-03-16 DIAGNOSIS — L70.0 ACNE VULGARIS: ICD-10-CM | Status: WELL CONTROLLED

## 2023-03-16 PROCEDURE — ? PHOTO-DOCUMENTATION

## 2023-03-16 PROCEDURE — ? PRESCRIPTION

## 2023-03-16 PROCEDURE — 99213 OFFICE O/P EST LOW 20 MIN: CPT

## 2023-03-16 PROCEDURE — ? PRESCRIPTION MEDICATION MANAGEMENT

## 2023-03-16 RX ORDER — ADAPALENE 3 MG/G
1 GEL TOPICAL QHS
Qty: 45 | Refills: 3 | Status: ERX | COMMUNITY
Start: 2023-03-16

## 2023-03-16 RX ADMIN — ADAPALENE 1: 3 GEL TOPICAL at 00:00

## 2023-03-16 ASSESSMENT — LOCATION SIMPLE DESCRIPTION DERM
LOCATION SIMPLE: RIGHT CHEEK
LOCATION SIMPLE: LEFT CHEEK
LOCATION SIMPLE: INFERIOR FOREHEAD

## 2023-03-16 ASSESSMENT — LOCATION DETAILED DESCRIPTION DERM
LOCATION DETAILED: LEFT CENTRAL MALAR CHEEK
LOCATION DETAILED: INFERIOR MID FOREHEAD
LOCATION DETAILED: RIGHT CENTRAL MALAR CHEEK

## 2023-03-16 ASSESSMENT — LOCATION ZONE DERM: LOCATION ZONE: FACE

## 2023-03-16 NOTE — PROCEDURE: PRESCRIPTION MEDICATION MANAGEMENT
Render In Strict Bullet Format?: No
Plan: Recommended laser treatments for acne scars
Detail Level: Zone
Initiate Treatment: adapalene 0.3% topical gel: Apply a pea size amount QHS to acne of face

## 2023-09-13 ENCOUNTER — APPOINTMENT (RX ONLY)
Dept: URBAN - METROPOLITAN AREA CLINIC 374 | Facility: CLINIC | Age: 23
Setting detail: DERMATOLOGY
End: 2023-09-13

## 2023-09-13 DIAGNOSIS — L70.0 ACNE VULGARIS: ICD-10-CM | Status: STABLE

## 2023-09-13 PROCEDURE — ? PHOTO-DOCUMENTATION

## 2023-09-13 PROCEDURE — 99213 OFFICE O/P EST LOW 20 MIN: CPT

## 2023-09-13 PROCEDURE — ? PRESCRIPTION MEDICATION MANAGEMENT

## 2023-09-13 ASSESSMENT — LOCATION SIMPLE DESCRIPTION DERM
LOCATION SIMPLE: RIGHT CHEEK
LOCATION SIMPLE: INFERIOR FOREHEAD
LOCATION SIMPLE: LEFT CHEEK

## 2023-09-13 ASSESSMENT — LOCATION ZONE DERM: LOCATION ZONE: FACE

## 2023-09-13 NOTE — PROCEDURE: PRESCRIPTION MEDICATION MANAGEMENT
Discontinue Regimen: adapalene 0.3% topical gel: Apply a pea size amount QHS to acne of face
Render In Strict Bullet Format?: No
Plan: Tc tretinoin cream if patient flares.
Detail Level: Zone

## 2023-10-30 NOTE — PROCEDURE: MEDICATION COUNSELING
Insp and Exp wheezing or little to no audible air movement Finasteride Counseling:  I discussed with the patient the risks of use of finasteride including but not limited to decreased libido, decreased ejaculate volume, gynecomastia, and depression. Women should not handle medication.  All of the patient's questions and concerns were addressed. Finasteride Male Counseling: Finasteride Counseling:  I discussed with the patient the risks of use of finasteride including but not limited to decreased libido, decreased ejaculate volume, gynecomastia, and depression. Women should not handle medication.  All of the patient's questions and concerns were addressed.

## 2024-05-31 NOTE — PROCEDURE: MEDICATION COUNSELING
1st attempt: sent [employer] message to patient.    Thank you kindly,  Jose Guadalupe LINARES       Adbry Pregnancy And Lactation Text: It is unknown if this medication will adversely affect pregnancy or breast feeding.

## 2024-11-12 NOTE — PROCEDURE: HIGH RISK MEDICATION MONITORING
Left second message    Bactrim Pregnancy And Lactation Text: This medication is Pregnancy Category D and is known to cause fetal risk.  It is also excreted in breast milk.

## 2025-01-03 NOTE — PROCEDURE: HIGH RISK MEDICATION MONITORING
No 21 Taltz Counseling: I discussed with the patient the risks of ixekizumab including but not limited to immunosuppression, serious infections, worsening of inflammatory bowel disease and drug reactions.  The patient understands that monitoring is required including a PPD at baseline and must alert us or the primary physician if symptoms of infection or other concerning signs are noted.